# Patient Record
Sex: FEMALE | Race: WHITE
[De-identification: names, ages, dates, MRNs, and addresses within clinical notes are randomized per-mention and may not be internally consistent; named-entity substitution may affect disease eponyms.]

---

## 2021-06-15 ENCOUNTER — HOSPITAL ENCOUNTER (INPATIENT)
Dept: HOSPITAL 79 - M/S | Age: 86
LOS: 2 days | Discharge: HOME | DRG: 186 | End: 2021-06-17
Attending: INTERNAL MEDICINE | Admitting: INTERNAL MEDICINE
Payer: MEDICARE

## 2021-06-15 VITALS — BODY MASS INDEX: 31.91 KG/M2 | HEIGHT: 61 IN | WEIGHT: 169 LBS

## 2021-06-15 DIAGNOSIS — Z90.49: ICD-10-CM

## 2021-06-15 DIAGNOSIS — J96.21: ICD-10-CM

## 2021-06-15 DIAGNOSIS — J44.9: ICD-10-CM

## 2021-06-15 DIAGNOSIS — Z86.711: ICD-10-CM

## 2021-06-15 DIAGNOSIS — Z82.49: ICD-10-CM

## 2021-06-15 DIAGNOSIS — Z88.0: ICD-10-CM

## 2021-06-15 DIAGNOSIS — Z90.710: ICD-10-CM

## 2021-06-15 DIAGNOSIS — Z86.718: ICD-10-CM

## 2021-06-15 DIAGNOSIS — I25.10: ICD-10-CM

## 2021-06-15 DIAGNOSIS — I12.9: ICD-10-CM

## 2021-06-15 DIAGNOSIS — M19.90: ICD-10-CM

## 2021-06-15 DIAGNOSIS — Z72.0: ICD-10-CM

## 2021-06-15 DIAGNOSIS — N18.30: ICD-10-CM

## 2021-06-15 DIAGNOSIS — Z85.89: ICD-10-CM

## 2021-06-15 DIAGNOSIS — R91.8: ICD-10-CM

## 2021-06-15 DIAGNOSIS — J90: Primary | ICD-10-CM

## 2021-06-15 DIAGNOSIS — Z99.81: ICD-10-CM

## 2021-06-15 DIAGNOSIS — Z96.642: ICD-10-CM

## 2021-06-15 DIAGNOSIS — K21.9: ICD-10-CM

## 2021-06-15 LAB
HGB BLD-MCNC: 12.8 GM/DL (ref 12.3–15.3)
RED BLOOD COUNT: 3.97 M/UL (ref 4–5.1)
WHITE BLOOD COUNT: 6.1 K/UL (ref 4.5–11)

## 2021-06-15 PROCEDURE — 0W9B3ZX DRAINAGE OF LEFT PLEURAL CAVITY, PERCUTANEOUS APPROACH, DIAGNOSTIC: ICD-10-PCS | Performed by: INTERNAL MEDICINE

## 2021-06-16 LAB
BUN/CREATININE RATIO: 25 (ref 0–10)
HGB BLD-MCNC: 11.8 GM/DL (ref 12.3–15.3)
RED BLOOD COUNT: 3.7 M/UL (ref 4–5.1)
WHITE BLOOD COUNT: 7.8 K/UL (ref 4.5–11)

## 2021-06-17 LAB
AMYLASE, BODY FLUID: (no result) U/L
BODY FLUID SOURCE: (no result)
BUN/CREATININE RATIO: 24 (ref 0–10)
HGB BLD-MCNC: 12.1 GM/DL (ref 12.3–15.3)
LDH FLD L TO P-CCNC: 127 U/L
MONONUCLEAR CELLS: 95 (ref 75–100)
POLYMORPHONUCLEAR %: 5 (ref 0–25)
RBC (AUTOMATED): 500 (ref 0–100000)
RED BLOOD COUNT: 3.83 M/UL (ref 4–5.1)
WBC (AUTOMATED): 418 (ref 0–500)
WHITE BLOOD COUNT: 5.9 K/UL (ref 4.5–11)

## 2021-06-17 NOTE — NUR
0800: SOLEDAD IBRAHIM RN ASSISTED DR. MUNIZ WITH BEDSIDE THORACENTESIS
PROCEDURE. 1200 ML CLEAR, YELLOW FLUID DRAINED IN TO BAG DURING PROCEDURE. NO
BLEEDING OR BRUISING NOTED. PATIENT DENIES PAIN OR COUGH. TOLERATED PROCEDURE
WELL.

## 2021-07-12 ENCOUNTER — HOSPITAL ENCOUNTER (OUTPATIENT)
Dept: HOSPITAL 79 - EXRD | Age: 86
End: 2021-07-12
Attending: THORACIC SURGERY (CARDIOTHORACIC VASCULAR SURGERY)
Payer: MEDICARE

## 2021-07-12 DIAGNOSIS — J98.11: ICD-10-CM

## 2021-07-12 DIAGNOSIS — Z00.00: Primary | ICD-10-CM

## 2021-07-12 DIAGNOSIS — J90: ICD-10-CM

## 2021-08-03 ENCOUNTER — HOSPITAL ENCOUNTER (OUTPATIENT)
Dept: HOSPITAL 79 - EXRD | Age: 86
End: 2021-08-03
Attending: INTERNAL MEDICINE
Payer: MEDICARE

## 2021-08-03 DIAGNOSIS — J90: Primary | ICD-10-CM

## 2021-08-05 ENCOUNTER — HOSPITAL ENCOUNTER (OUTPATIENT)
Dept: HOSPITAL 79 - OR | Age: 86
Discharge: HOME | End: 2021-08-05
Attending: SURGERY
Payer: MEDICARE

## 2021-08-05 DIAGNOSIS — Z88.0: ICD-10-CM

## 2021-08-05 DIAGNOSIS — I25.10: ICD-10-CM

## 2021-08-05 DIAGNOSIS — I50.9: ICD-10-CM

## 2021-08-05 DIAGNOSIS — Z99.81: ICD-10-CM

## 2021-08-05 DIAGNOSIS — I13.0: ICD-10-CM

## 2021-08-05 DIAGNOSIS — Z20.822: ICD-10-CM

## 2021-08-05 DIAGNOSIS — J96.11: ICD-10-CM

## 2021-08-05 DIAGNOSIS — Z88.6: ICD-10-CM

## 2021-08-05 DIAGNOSIS — J90: Primary | ICD-10-CM

## 2021-08-05 DIAGNOSIS — N18.30: ICD-10-CM

## 2021-08-05 DIAGNOSIS — Z87.891: ICD-10-CM

## 2021-08-05 DIAGNOSIS — I73.9: ICD-10-CM

## 2021-08-05 DIAGNOSIS — Z79.899: ICD-10-CM

## 2021-08-05 DIAGNOSIS — D63.1: ICD-10-CM

## 2021-08-05 DIAGNOSIS — J44.9: ICD-10-CM

## 2021-08-05 DIAGNOSIS — Z88.1: ICD-10-CM

## 2021-08-05 PROCEDURE — U0002 COVID-19 LAB TEST NON-CDC: HCPCS

## 2021-08-05 PROCEDURE — C1729 CATH, DRAINAGE: HCPCS

## 2021-09-03 ENCOUNTER — HOSPITAL ENCOUNTER (OUTPATIENT)
Dept: HOSPITAL 79 - KOH-I | Age: 86
End: 2021-09-03
Attending: INTERNAL MEDICINE
Payer: MEDICARE

## 2021-09-03 DIAGNOSIS — J90: Primary | ICD-10-CM

## 2021-10-15 ENCOUNTER — HOSPITAL ENCOUNTER (EMERGENCY)
Dept: HOSPITAL 79 - ER1 | Age: 86
Discharge: HOME | End: 2021-10-15
Payer: MEDICARE

## 2021-10-15 DIAGNOSIS — J90: ICD-10-CM

## 2021-10-15 DIAGNOSIS — J44.9: ICD-10-CM

## 2021-10-15 DIAGNOSIS — Z79.899: ICD-10-CM

## 2021-10-15 DIAGNOSIS — T85.9XXA: Primary | ICD-10-CM

## 2021-10-15 DIAGNOSIS — N18.9: ICD-10-CM

## 2021-10-15 DIAGNOSIS — Z79.01: ICD-10-CM

## 2021-10-15 DIAGNOSIS — E86.0: ICD-10-CM

## 2021-10-15 DIAGNOSIS — Z88.8: ICD-10-CM

## 2021-10-15 DIAGNOSIS — I12.9: ICD-10-CM

## 2021-10-15 LAB
BUN/CREATININE RATIO: 19 (ref 0–10)
HGB BLD-MCNC: 10.9 GM/DL (ref 12.3–15.3)
RED BLOOD COUNT: 3.48 M/UL (ref 4–5.1)
WHITE BLOOD COUNT: 5.5 K/UL (ref 4.5–11)